# Patient Record
(demographics unavailable — no encounter records)

---

## 2025-05-29 NOTE — PLAN
[FreeTextEntry1] : \par  Well woman visit\par  \par  pap done\par  std cultures done\par  condoms always\par  rt in 1 year\par

## 2025-05-29 NOTE — HISTORY OF PRESENT ILLNESS
[FreeTextEntry1] : 28 yo here for av. She has no gyn complaints. Periods are regular. Pt is sexually active, does not want birth control, uses condoms. She denies family h/o breast,gyn or colon cancer. She had gardasil vac. She works for a [employer] company